# Patient Record
Sex: FEMALE | Race: WHITE | NOT HISPANIC OR LATINO | Employment: UNEMPLOYED
[De-identification: names, ages, dates, MRNs, and addresses within clinical notes are randomized per-mention and may not be internally consistent; named-entity substitution may affect disease eponyms.]

---

## 2014-02-20 LAB — HPV16+18+45 E6+E7MRNA CVX NAA+PROBE: POSITIVE

## 2014-08-20 LAB — CYTOLOGY CVX/VAG DOC THIN PREP: NORMAL

## 2014-11-06 LAB — HM MAMMOGRAPHY BILATERAL: NORMAL

## 2015-08-05 LAB — HM MAMMOGRAM LEFT: NORMAL

## 2017-02-06 ENCOUNTER — CHARTING TRANS (OUTPATIENT)
Dept: OTHER | Age: 52
End: 2017-02-06

## 2017-02-08 ENCOUNTER — LAB SERVICES (OUTPATIENT)
Dept: OTHER | Age: 52
End: 2017-02-08

## 2017-02-09 ENCOUNTER — LAB SERVICES (OUTPATIENT)
Dept: OTHER | Age: 52
End: 2017-02-09

## 2017-02-09 ENCOUNTER — CHARTING TRANS (OUTPATIENT)
Dept: OTHER | Age: 52
End: 2017-02-09

## 2017-02-09 LAB
BILIRUBIN URINE: NEGATIVE
BLOOD URINE: ABNORMAL
CLARITY: CLEAR
COLOR: YELLOW
GLUCOSE QUALITATIVE U: NEGATIVE
KETONES, URINE: ABNORMAL
LEUKOCYTE ESTERASE URINE: NEGATIVE
NITRITE URINE: NEGATIVE
PH URINE: 5.5 (ref 5–7)
SPECIFIC GRAVITY URINE: 1.01 (ref 1–1.03)
URINE PROTEIN, QUAL (DIPSTICK): NEGATIVE
UROBILINOGEN URINE: 0.2

## 2017-02-10 LAB
25(OH)D3 SERPL-MCNC: 46.7 NG/ML (ref 30–100)
CORTIS SERPL-MCNC: 12.4 MCG/DL (ref 3.4–22.5)
T4 FREE SERPL-MCNC: 0.89 NG/DL (ref 0.78–2.19)
TSH SERPL-ACNC: 0.86 M[IU]/L (ref 0.3–4.82)

## 2017-02-13 LAB — ACTH PLAS-MCNC: 8.9 PG/ML (ref 0–46)

## 2017-02-15 LAB
COLLECTION TIME: 24
METANEPHRINE: 108 UG/24 HR (ref 52–341)
METANEPHS UR-MCNC: 294 UG/24 HR (ref 140–785)
NORMETANEPHRINE: 186 UG/24 HR (ref 88–444)
TOTAL VOLUME: 3000

## 2017-02-16 ENCOUNTER — CHARTING TRANS (OUTPATIENT)
Dept: OTHER | Age: 52
End: 2017-02-16

## 2017-02-17 ENCOUNTER — IMAGING SERVICES (OUTPATIENT)
Dept: OTHER | Age: 52
End: 2017-02-17

## 2017-02-17 LAB
CATECHOLS UR-IMP: NORMAL
COLLECT DURATION TIME SPEC: NORMAL H
CREAT 24H UR-MRATE: NORMAL G/(24.H)
CREAT UR-MCNC: NORMAL MG/DL
DOPAMINE 24H UR-MRATE: NORMAL
DOPAMINE UR-MCNC: NORMAL UG/L
DOPAMINE/CREAT UR: NORMAL
EPINEPH UR-MCNC: NORMAL PG/ML
EPINEPH/CREAT UR: NORMAL
EPINEPHRINE/24 HR: NORMAL
NOREPINEPH 24H UR-MRATE: NORMAL
NOREPINEPH UR-MCNC: NORMAL UG/ML
NOREPINEPH/CREAT UR: NORMAL
SPECIMEN VOL ?TM UR: NORMAL ML

## 2017-07-13 ENCOUNTER — EXTERNAL RECORD (OUTPATIENT)
Dept: OTHER | Age: 52
End: 2017-07-13

## 2017-12-08 ENCOUNTER — HOSPITAL ENCOUNTER (OUTPATIENT)
Facility: HOSPITAL | Age: 52
Setting detail: OBSERVATION
Discharge: HOME | End: 2017-12-11
Attending: EMERGENCY MEDICINE | Admitting: HOSPITALIST
Payer: COMMERCIAL

## 2017-12-08 DIAGNOSIS — I47.2 VENTRICULAR TACHYCARDIA, NON-SUSTAINED (HCC): ICD-10-CM

## 2017-12-08 DIAGNOSIS — R55 SYNCOPE AND COLLAPSE: Primary | ICD-10-CM

## 2017-12-08 PROCEDURE — 99223 1ST HOSP IP/OBS HIGH 75: CPT | Performed by: HOSPITALIST

## 2017-12-08 RX ORDER — ACETAMINOPHEN 325 MG/1
650 TABLET ORAL EVERY 4 HOURS PRN
Status: DISCONTINUED | OUTPATIENT
Start: 2017-12-08 | End: 2017-12-11

## 2017-12-08 RX ORDER — ONDANSETRON 2 MG/ML
4 INJECTION INTRAMUSCULAR; INTRAVENOUS EVERY 6 HOURS PRN
Status: DISCONTINUED | OUTPATIENT
Start: 2017-12-08 | End: 2017-12-11

## 2017-12-08 RX ORDER — HYDROCODONE BITARTRATE AND ACETAMINOPHEN 5; 325 MG/1; MG/1
2 TABLET ORAL EVERY 4 HOURS PRN
Status: DISCONTINUED | OUTPATIENT
Start: 2017-12-08 | End: 2017-12-11

## 2017-12-08 RX ORDER — HYDROCODONE BITARTRATE AND ACETAMINOPHEN 5; 325 MG/1; MG/1
1 TABLET ORAL EVERY 4 HOURS PRN
Status: DISCONTINUED | OUTPATIENT
Start: 2017-12-08 | End: 2017-12-11

## 2017-12-08 RX ORDER — ALPRAZOLAM 0.25 MG/1
0.25 TABLET ORAL NIGHTLY PRN
COMMUNITY
End: 2021-08-12 | Stop reason: ALTCHOICE

## 2017-12-08 RX ORDER — BISACODYL 10 MG
10 SUPPOSITORY, RECTAL RECTAL
Status: DISCONTINUED | OUTPATIENT
Start: 2017-12-08 | End: 2017-12-11

## 2017-12-08 RX ORDER — CLONAZEPAM 0.5 MG/1
0.5 TABLET ORAL 2 TIMES DAILY PRN
Status: DISCONTINUED | OUTPATIENT
Start: 2017-12-08 | End: 2017-12-11

## 2017-12-08 RX ORDER — FLUOXETINE 10 MG/1
10 CAPSULE ORAL DAILY
COMMUNITY

## 2017-12-08 RX ORDER — POLYETHYLENE GLYCOL 3350 17 G/17G
17 POWDER, FOR SOLUTION ORAL DAILY PRN
Status: DISCONTINUED | OUTPATIENT
Start: 2017-12-08 | End: 2017-12-11

## 2017-12-08 RX ORDER — ALBUTEROL SULFATE 90 UG/1
2 AEROSOL, METERED RESPIRATORY (INHALATION) EVERY 6 HOURS PRN
COMMUNITY
End: 2021-08-12 | Stop reason: ALTCHOICE

## 2017-12-08 RX ORDER — SOLIFENACIN SUCCINATE 10 MG/1
10 TABLET, FILM COATED ORAL DAILY
COMMUNITY

## 2017-12-08 RX ORDER — CLONAZEPAM 0.5 MG/1
0.5 TABLET ORAL 2 TIMES DAILY PRN
COMMUNITY

## 2017-12-08 RX ORDER — DIAZEPAM 5 MG/1
5 TABLET ORAL NIGHTLY PRN
Status: DISCONTINUED | OUTPATIENT
Start: 2017-12-08 | End: 2017-12-11

## 2017-12-08 RX ORDER — FLUOXETINE 10 MG/1
10 TABLET, FILM COATED ORAL DAILY
Status: DISCONTINUED | OUTPATIENT
Start: 2017-12-09 | End: 2017-12-11

## 2017-12-08 RX ORDER — DOCUSATE SODIUM 100 MG/1
100 CAPSULE, LIQUID FILLED ORAL 2 TIMES DAILY
Status: DISCONTINUED | OUTPATIENT
Start: 2017-12-08 | End: 2017-12-11

## 2017-12-08 RX ORDER — DIAZEPAM 5 MG/1
5 TABLET ORAL NIGHTLY PRN
COMMUNITY

## 2017-12-08 NOTE — ED NOTES
PT STATES SHE WAS TALKING TO HER DAUGHTER AND HER CHEST BECAME TIGHT. PT HAD RUN OF V-TACH AND STATES SHE CONTINUES WITH CHEST PRESSURE.   ER MD AT BEDSIDE

## 2017-12-08 NOTE — ED PROVIDER NOTES
Patient Seen in: THE [facility] Emergency Department In [place]    History   Patient presents with:  Syncope (cardiovascular, neurologic)    Stated Complaint: syncopal episode    HPI    55-year-old female presents to the emerge department after having a synco Comment: normal TI, normal colon; biopsies for                microscopic colitis  No date: HERNIA SURGERY  No date: OTHER SURGICAL HISTORY      Comment: R arm tendon repair        Smoking status: Former Smoker appropriately   Skin: Skin is warm and dry. No pallor. Nursing note and vitals reviewed.            ED Course     Labs Reviewed   COMP METABOLIC PANEL (14) - Abnormal; Notable for the following:        Result Value    AST 13 (*)     All other components w established and baseline blood work obtained. She had the tryptase level ordered as requested by her primary care physician from [facility]. We discussed that this was a send out and they did not anticipate getting those results back.   Patient blood wo

## 2017-12-09 ENCOUNTER — APPOINTMENT (OUTPATIENT)
Dept: CV DIAGNOSTICS | Facility: HOSPITAL | Age: 52
End: 2017-12-09
Attending: INTERNAL MEDICINE
Payer: COMMERCIAL

## 2017-12-09 PROCEDURE — 93306 TTE W/DOPPLER COMPLETE: CPT | Performed by: INTERNAL MEDICINE

## 2017-12-09 PROCEDURE — 99232 SBSQ HOSP IP/OBS MODERATE 35: CPT | Performed by: INTERNAL MEDICINE

## 2017-12-09 RX ORDER — HEPARIN SODIUM 5000 [USP'U]/ML
5000 INJECTION, SOLUTION INTRAVENOUS; SUBCUTANEOUS EVERY 8 HOURS SCHEDULED
Status: DISCONTINUED | OUTPATIENT
Start: 2017-12-09 | End: 2017-12-11

## 2017-12-09 RX ORDER — DIPHENHYDRAMINE HCL 25 MG
25 CAPSULE ORAL EVERY 6 HOURS PRN
Status: DISCONTINUED | OUTPATIENT
Start: 2017-12-09 | End: 2017-12-11

## 2017-12-09 NOTE — PLAN OF CARE
Pt currently resting in bed. Called into room at approximately [phone] with pt stating she was starting to feel chest tightness and faint, per symptoms mimicking what usually precipitates her syncopal episodes. VSS, no changes on tele noted.   Pt continues n

## 2017-12-09 NOTE — PROGRESS NOTES
[facility]  Progress Note     Keshia Hdz Patient Status:  Inpatient    3/7/1965 MRN [id number]   [facility] 7NE-A Attending Jerrod Garzon MD   Hosp Day # 1 PCP Matilde Severance     Chief Complaint: NSVT, syncope    S: Patient with EP to eval  4. Start BB  5. Possible EPS/Cardiac MRI  2. Ehler's-Danlos  3. MTHFR  4. Fibromyalgia   5. Chronic pain on prn norco   6.  Anxiety     Plan of care: await cardiac workup    Quality:  · DVT Prophylaxis: SCD, HSQ  · CODE status: Full  · Echols: no

## 2017-12-09 NOTE — H&P
[facility]  History and Physical     [facility] Patient Status:  Inpatient    3/7/1965 MRN [id number]   [facility] 7NE-A Attending Ignacio Aragon MD   Hosp Day # 0 PCP Luis Chambers     Chief Complaint: NSVT    History of Prese Mother      COPD   • Neurological Disorder Brother      club foot.     • Psychiatric Brother      drug abuse in past   • Other [OTHER] Brother      paralyzed from self inflicted wound   • Other [OTHER] Son      schizophrenic       Allergies:   No Known Celestino HGB  14.1   MCV  95.9   PLT  233.0       Recent Labs   Lab  12/08/17   1547   GLU  73   BUN  13   CREATSERUM  1.00   CA  8.3   ALB  3.6   NA  140   K  4.1   CL  105   CO2  30.0   ALKPHO  47   AST  13*   ALT  19   BILT  0.3   TP  6.7       Estimated Creat

## 2017-12-09 NOTE — CONSULTS
[facility]  Cardiology History & Physical    Liu [place] Patient Status:  Inpatient    3/7/1965 MRN [id number]   [facility] 7NE-A Attending Luis F Bae MD   Hosp Day # 0 NICOLASA Porter     Date of Admission:  2017    Histo Comment: R arm tendon repair    Social History:  Social History    Marital status:              Spouse name:                       Years of education:                 Number of children:               Social History Main Topics    Smoking status:  Fo 1802   Gross per 24 hour   Intake             1000 ml   Output                0 ml   Net             1000 ml     Wt Readings from Last 3 Encounters:  12/08/17 : 44 lb 0.4 oz (20 kg)    Physical Exam:   General: Alert and oriented x 3. No apparent distress.

## 2017-12-09 NOTE — PROGRESS NOTES
[facility]  Progress Note    Usha Nolen Patient Status:  Inpatient    3/7/1965 MRN [id number]   [facility] 7NE-A Attending Harriet Carroll MD   Hosp Day # 1 PCP Precilla Mutters       Assessment and Plan:  Patient Active Problem List: kg)  03/27/12 : 167 lb (75.8 kg)      Allergies:    No Known Allergies          Physical Exam:  Blood pressure 90/48, pulse 57, temperature 98.4 °F (36.9 °C), temperature source Oral, resp.  rate 16, height 5' 5\" (1.651 m), weight 120 lb (54.4 kg), last me MD  12/9/2017  7:30 AM

## 2017-12-09 NOTE — PLAN OF CARE
Patient admitted in stable condition. Resting comfortably in bed. Denies any dizziness. Feels ok right now. Plan for 2D echo in am. Will monitor. No ectopy noted on tele. Remains SR/SB overnight.

## 2017-12-10 PROCEDURE — 99232 SBSQ HOSP IP/OBS MODERATE 35: CPT | Performed by: INTERNAL MEDICINE

## 2017-12-10 RX ORDER — SODIUM CHLORIDE 9 MG/ML
INJECTION, SOLUTION INTRAVENOUS CONTINUOUS
Status: DISCONTINUED | OUTPATIENT
Start: 2017-12-10 | End: 2017-12-11

## 2017-12-10 NOTE — PROGRESS NOTES
[facility] Cardiology Progress Note    Subjective:  Feels like \"crap:\". Ongoing neck and upper chest pain, a chronic issue.      Objective:  BP 93/50 (BP Location: Left arm)   Pulse 58   Temp 97.9 °F (36.6 °C) (Oral)   Resp 17   Ht 165.1 cm (5' 5\")   Wt 120

## 2017-12-10 NOTE — PROGRESS NOTES
[facility]  Progress Note     Keshia Hdz Patient Status:  Inpatient    3/7/1965 MRN [id number]   [facility] 7NE-A Attending Jerrod Garzon MD   Hosp Day # 2 PCP Matilde Severance     Chief Complaint: NSVT, syncope    S: Patient with of syncope   1. Tele  2. Echo reviewed, normal  3. Cardiology, EP to eval  4. Start BB  5. Possible EPS/Cardiac MRI  2. Ehler's-Danlos  3. MTHFR  4. Fibromyalgia   5. Chronic pain on prn norco   6.  Anxiety     Plan of care: await cardiac workup    Quality:

## 2017-12-10 NOTE — PLAN OF CARE
DISCHARGE PLANNING    • Discharge to home or other facility with appropriate resources Progressing        NEUROLOGICAL - ADULT    • Achieves maximal functionality and self care Progressing        Patient/Family Goals    • Patient/Family Long Term Goal Prog

## 2017-12-10 NOTE — PLAN OF CARE
Pt currently resting in bed. RA, spo2>93%. SR/SB on tele. A&O. No pre-syncopal aura thus far this shift. Will continue to monitor.

## 2017-12-10 NOTE — PROGRESS NOTES
Assumed care of patient from [phone]. Patient is alert and oriented. Received Norco once for complaints of chronic back pain. Benadryl ordered for her for complaints of itchiness. No complaints of feeling faint or chest tightness.   No complaints of n

## 2017-12-11 VITALS
RESPIRATION RATE: 17 BRPM | OXYGEN SATURATION: 100 % | BODY MASS INDEX: 19.99 KG/M2 | WEIGHT: 120 LBS | TEMPERATURE: 98 F | SYSTOLIC BLOOD PRESSURE: 101 MMHG | HEART RATE: 63 BPM | DIASTOLIC BLOOD PRESSURE: 55 MMHG | HEIGHT: 65 IN

## 2017-12-11 PROCEDURE — 99239 HOSP IP/OBS DSCHRG MGMT >30: CPT | Performed by: INTERNAL MEDICINE

## 2017-12-11 PROCEDURE — 99255 IP/OBS CONSLTJ NEW/EST HI 80: CPT | Performed by: OTHER

## 2017-12-11 PROCEDURE — 95816 EEG AWAKE AND DROWSY: CPT | Performed by: OTHER

## 2017-12-11 NOTE — CONSULTS
[facility]    Report of Consultation    Mirella Render Patient Status:  Inpatient    3/7/1965 MRN [id number]   [facility] 7NE-A Attending Mary Alaniz MD   [facility] Day # 3 PCP Michelle Timmons     Date of Admission:  2017  Date of C Relation Age of Onset   • Heart Disorder Father    • Alcohol and Other Disorders Associated Father    • Heart Disease Father    • Other Sam Nian Father      COPD   • Cancer Mother      renal cell   • Hypertension Mother    • Other [OTHER] Mother      COPD temperature source Oral, resp. rate 17, height 65\", weight 120 lb, last menstrual period 07/28/2011, SpO2 100 %. GENERAL:  Patient is a(n) 46year old female in no acute distress.   HEENT:  Normocephalic, atraumatic  LUNGS: Clear to auscultation bilater low. However, would obtain EEG to evaluate for presence of any abnormal temporal discharges. I suspect this may be a dysautonomic disorder which can be seen with EDS.  Further confirmatory testing at an autonomic center is recommended- tilt table, sudomotor

## 2017-12-11 NOTE — PLAN OF CARE
Assumed care of patient at 0730  AOx4, RA, NSR on tele  C/o back pain- managed with PO [place] at patient request  VSS  Tolerating diet with good appetite  Patient reports lack of sleep last noc, no reports of dizziness currently but is intermittent  IVF inf

## 2017-12-11 NOTE — PROGRESS NOTES
After EEG completed, patient reported brief episode of pain in her chest, patient reports that \"this is the same pain I get before every episode\" Also reported facial twitching and HA.  Patient sitting up in bed laughing and talking with her  and E

## 2017-12-11 NOTE — PLAN OF CARE
NURSING DISCHARGE NOTE    Discharged home via private car  Accompanied by   Belongings packed up by patient and taken with    All d/c instructions given to patient and discussed. Medications and f/u appts discussed.  Rx for Lopressor given to pat

## 2017-12-11 NOTE — PROGRESS NOTES
[facility]  Progress Note    Naz Gunn Patient Status:  Inpatient    3/7/1965 MRN [id number]   [facility] 7NE-A Attending Krzysztof Schwarz MD   [facility] Day # 3 PCP Roney Maria     Assessment:  1.   Recurren ?syncope - description does 5,000 Units Subcutaneous [facility]   • metoprolol tartrate  6.25 mg Oral 2x Daily(Beta Blocker)   • FLUoxetine HCl  10 mg Oral Daily   • docusate sodium  100 mg Oral BID     • sodium chloride 100 mL/hr at 12/11/17 0841       Anitha Henry MD  12/11/2017  11:

## 2017-12-11 NOTE — PROGRESS NOTES
[facility]  Progress Note     Sajan Adánal Patient Status:  Inpatient    3/7/1965 MRN [id number]   [facility] 7NE-A Attending Cata Rodriguez MD   Hosp Day # 3 PCP Jessie Angel     Chief Complaint: NSVT, syncope    S: Patient with 1. Tele  2. Echo reviewed, normal  3. Cardiology, EP to eval  4. Tolerating BB  5. Possible EPS/Cardiac MRI  2. Ehler's-Danlos  3. MTHFR  4. Fibromyalgia   5. Chronic pain on prn norco   6.  Anxiety     Plan of care: await cardia/EP workup    Quality:  ·

## 2017-12-11 NOTE — PLAN OF CARE
Patient/Family Goals    • Patient/Family Long Term Goal Progressing    • Patient/Family Short Term Goal Progressing        SAFETY ADULT - FALL    • Free from fall injury Progressing          Patient is alert and oriented.   About 2030 patient complained of

## 2017-12-11 NOTE — DISCHARGE SUMMARY
[facility]  DISCHARGE SUMMARY     Jensen Hardy Patient Status:  Inpatient    3/7/1965 MRN [id number]   [facility] 7NE-A Attending Mukesh mAador MD   [facility] Day # 3 PCP Albino Frazier     Date of Admission: 2017  Date of Dischar recommendations (brief descriptions):  • none    Lab/Test results pending at Discharge:   · none    Consultants:  • Cardiology, Neurology    Discharge Medication List:     Discharge Medications      CHANGE how you take these medications      Instructions P INSTRUCTIONS: See electronic chart    Vanesa Molina MD 12/11/2017    Time spent:  > 30 minutes

## 2017-12-12 NOTE — PROCEDURES
[address]      PATIENT'S NAME: Jorge Gustafson   ATTENDING PHYSICIAN: Tyson Clarke MD   PATIENT ACCOUNT #: [de-identified] LOCATION: [address] [place] [address]   MEDICAL RECORD #: [id number] DATE OF BIRTH: 1

## 2017-12-14 NOTE — PAYOR COMM NOTE
--------------  Amy Zamudio  (MR # [id number])   Order Admit to inpatient [facility] (Order [id number])   Order Requisition   Admit to inpatient [facility] (Order #[id number]) on 12/8/17        Question Answer   Diagnosis Syncope and collapse   Level of Care Tele 59-year-old female presents to the emerge department after having a syncopal episode. Patient states she has had multiple syncopal episodes and has been worked up at [facility].   She states she had an angiogram back in February that she was told had \"n Eyes: EOM are normal. Pupils are equal, round, and reactive to light. Neck: Normal range of motion. Neck supple. Cardiovascular: Normal rate, regular rhythm, normal heart sounds and intact distal pulses.     Pulmonary/Chest: Effort normal and breath tennille Patient IV established and baseline blood work obtained. She had the tryptase level ordered as requested by her primary care physician from [facility]. We discussed that this was a send out and they did not anticipate getting those results back.   José Miguel GLU  73   BUN  13   CREATSERUM  1.00   CA  8.3   ALB  3.6   NA  140   K  4.1   CL  105   CO2  30.0   ALKPHO  47   AST  13*   ALT  19   BILT  0.3   TP  6.7     TROP  <0.046     ASSESSMENT / PLAN:     1. NSVT with history of syncope   1. Tele  2. Echo  3.  Ca Vital signs:  Temp:  [98 °F (36.7 °C)-98.8 °F (37.1 °C)] 98.4 °F (36.9 °C)  Pulse:  [57-66] 64  Resp:  [15-17] 17  BP: ()/(48-95) 116/67    NURSING:  Pt currently resting in bed.   Called into room at approximately [phone] with pt stating she was startin · EP to see tomorrow    About 2030 patient complained of lightheadedness. BP was 101/59. Recheck an hour later BP was 86/62 while sitting and 109/87 when standing. IV fluids started.   Encouraged patient to call for assistance before ambulating due to lo

## 2018-11-23 ENCOUNTER — IMAGING SERVICES (OUTPATIENT)
Dept: OTHER | Age: 53
End: 2018-11-23

## 2018-11-29 VITALS
HEIGHT: 66 IN | SYSTOLIC BLOOD PRESSURE: 98 MMHG | HEART RATE: 52 BPM | DIASTOLIC BLOOD PRESSURE: 58 MMHG | BODY MASS INDEX: 18.32 KG/M2 | WEIGHT: 114 LBS

## 2018-12-05 LAB — COLONOSCOPY STUDY: NORMAL

## 2018-12-07 LAB — NONINV COLON CA DNA+OCC BLD SCRN STL QL: NEGATIVE

## 2019-08-26 ENCOUNTER — EXTERNAL RECORD (OUTPATIENT)
Dept: OTHER | Age: 54
End: 2019-08-26

## 2019-12-31 ENCOUNTER — EXTERNAL RECORD (OUTPATIENT)
Dept: OTHER | Age: 54
End: 2019-12-31

## 2020-06-24 ENCOUNTER — EXTERNAL RECORD (OUTPATIENT)
Dept: OTHER | Age: 55
End: 2020-06-24

## 2021-01-25 ENCOUNTER — EXTERNAL RECORD (OUTPATIENT)
Dept: OTHER | Age: 56
End: 2021-01-25

## 2021-01-26 ENCOUNTER — EXTERNAL RECORD (OUTPATIENT)
Dept: OTHER | Age: 56
End: 2021-01-26

## 2021-03-09 ENCOUNTER — EXTERNAL RECORD (OUTPATIENT)
Dept: OTHER | Age: 56
End: 2021-03-09

## 2021-05-25 ENCOUNTER — EXTERNAL RECORD (OUTPATIENT)
Dept: OTHER | Age: 56
End: 2021-05-25

## 2021-06-01 ENCOUNTER — EXTERNAL RECORD (OUTPATIENT)
Dept: OTHER | Age: 56
End: 2021-06-01

## 2021-08-12 ENCOUNTER — OFFICE VISIT (OUTPATIENT)
Dept: HEMATOLOGY/ONCOLOGY | Facility: HOSPITAL | Age: 56
End: 2021-08-12
Attending: INTERNAL MEDICINE
Payer: COMMERCIAL

## 2021-08-12 VITALS
OXYGEN SATURATION: 96 % | HEART RATE: 63 BPM | SYSTOLIC BLOOD PRESSURE: 122 MMHG | DIASTOLIC BLOOD PRESSURE: 74 MMHG | BODY MASS INDEX: 19.61 KG/M2 | TEMPERATURE: 98 F | WEIGHT: 122 LBS | HEIGHT: 66 IN | RESPIRATION RATE: 16 BRPM

## 2021-08-12 DIAGNOSIS — Q79.60 EHLERS-DANLOS SYNDROME: ICD-10-CM

## 2021-08-12 DIAGNOSIS — R58 BLEEDING: Primary | ICD-10-CM

## 2021-08-12 PROCEDURE — 99204 OFFICE O/P NEW MOD 45 MIN: CPT | Performed by: INTERNAL MEDICINE

## 2021-08-12 RX ORDER — FLUTICASONE PROPIONATE 50 MCG
2 SPRAY, SUSPENSION (ML) NASAL DAILY
COMMUNITY
Start: 2021-08-04

## 2021-08-12 RX ORDER — RIZATRIPTAN BENZOATE 5 MG/1
5 TABLET, ORALLY DISINTEGRATING ORAL DAILY PRN
COMMUNITY
Start: 2021-08-08

## 2021-08-12 RX ORDER — CLONAZEPAM 1 MG/1
TABLET ORAL
COMMUNITY
Start: 2021-07-23

## 2021-08-12 RX ORDER — METHYLPHENIDATE HYDROCHLORIDE 10 MG/1
TABLET ORAL
COMMUNITY
Start: 2021-07-23

## 2021-08-13 NOTE — CONSULTS
[facility]    PATIENT'S NAME: PADUA, JADA M   CONSULTING PHYSICIAN: James Donovan.  Jeanette Crow MD   PATIENT ACCOUNT #: [de-identified] LOCATION: [address] [place] [address] RECORD #: [id number] YOB: 1965   CONSULTATION DATE: 08/12/2021       [facility] hydrocodone/acetaminophen, metoprolol, vitamin B, omega fatty acids, fish oil. ALLERGIES:  Flexeril, NSAIDs, penicillins, quinolones, Reglan, tramadol.     SOCIAL HISTORY:  She denies any tobacco use, drinks alcohol on occasion, denies any illicit drug u very much for the consultation request and for allowing us to participate in the care of this delightful patient. Dictated By Alessandra Fan MD  d: 08/12/2021 16:16:00  t: 08/13/2021 07:56:56  Job [id number]  [facility]/

## 2021-08-17 ENCOUNTER — EXTERNAL RECORD (OUTPATIENT)
Dept: OTHER | Age: 56
End: 2021-08-17

## 2021-08-24 ENCOUNTER — TELEPHONE (OUTPATIENT)
Dept: HEMATOLOGY/ONCOLOGY | Facility: HOSPITAL | Age: 56
End: 2021-08-24

## 2021-08-24 NOTE — TELEPHONE ENCOUNTER
Lab ordered by Dr Suzanna Barraza not completed for tomorrow's visit. Patient contacted and she shares she cannot get the test done at this time. She was in the ED with pain which required steroids and IV narcotics.   She does not know when she can complete the testin

## 2021-08-25 ENCOUNTER — APPOINTMENT (OUTPATIENT)
Dept: HEMATOLOGY/ONCOLOGY | Facility: HOSPITAL | Age: 56
End: 2021-08-25
Attending: INTERNAL MEDICINE
Payer: COMMERCIAL

## 2021-10-13 ENCOUNTER — TELEPHONE (OUTPATIENT)
Dept: INTERNAL MEDICINE | Age: 56
End: 2021-10-13

## 2021-10-20 DIAGNOSIS — M19.90 ARTHRITIS: Primary | ICD-10-CM

## 2021-10-20 DIAGNOSIS — M79.7 FIBROMYALGIA: ICD-10-CM

## 2021-11-09 ENCOUNTER — HOSPITAL ENCOUNTER (OUTPATIENT)
Dept: GENERAL RADIOLOGY | Age: 56
Discharge: HOME | End: 2021-11-09
Attending: NURSE PRACTITIONER

## 2021-11-09 ENCOUNTER — LAB SERVICES (OUTPATIENT)
Dept: LAB | Age: 56
End: 2021-11-09

## 2021-11-09 ENCOUNTER — OFFICE VISIT (OUTPATIENT)
Dept: RHEUMATOLOGY | Age: 56
End: 2021-11-09
Attending: INTERNAL MEDICINE

## 2021-11-09 VITALS
BODY MASS INDEX: 21.36 KG/M2 | DIASTOLIC BLOOD PRESSURE: 70 MMHG | HEIGHT: 65 IN | SYSTOLIC BLOOD PRESSURE: 116 MMHG | RESPIRATION RATE: 16 BRPM | HEART RATE: 62 BPM | WEIGHT: 128.2 LBS

## 2021-11-09 DIAGNOSIS — M25.50 POLYARTHRALGIA: ICD-10-CM

## 2021-11-09 DIAGNOSIS — F11.90 OPIOID USE: ICD-10-CM

## 2021-11-09 DIAGNOSIS — I73.00 RAYNAUD'S DISEASE WITHOUT GANGRENE: ICD-10-CM

## 2021-11-09 DIAGNOSIS — R76.8 POSITIVE ANA (ANTINUCLEAR ANTIBODY): ICD-10-CM

## 2021-11-09 DIAGNOSIS — M25.50 POLYARTHRALGIA: Primary | ICD-10-CM

## 2021-11-09 DIAGNOSIS — M51.36 DDD (DEGENERATIVE DISC DISEASE), LUMBAR: ICD-10-CM

## 2021-11-09 DIAGNOSIS — M35.7 HYPERMOBILITY SYNDROME: ICD-10-CM

## 2021-11-09 LAB
CRP SERPL-MCNC: <0.3 MG/DL
ERYTHROCYTE [SEDIMENTATION RATE] IN BLOOD BY WESTERGREN METHOD: 5 MM/HR (ref 0–20)

## 2021-11-09 PROCEDURE — 73030 X-RAY EXAM OF SHOULDER: CPT

## 2021-11-09 PROCEDURE — 73630 X-RAY EXAM OF FOOT: CPT

## 2021-11-09 PROCEDURE — 86140 C-REACTIVE PROTEIN: CPT | Performed by: INTERNAL MEDICINE

## 2021-11-09 PROCEDURE — 73130 X-RAY EXAM OF HAND: CPT | Performed by: RADIOLOGY

## 2021-11-09 PROCEDURE — 73630 X-RAY EXAM OF FOOT: CPT | Performed by: RADIOLOGY

## 2021-11-09 PROCEDURE — 73521 X-RAY EXAM HIPS BI 2 VIEWS: CPT

## 2021-11-09 PROCEDURE — 73521 X-RAY EXAM HIPS BI 2 VIEWS: CPT | Performed by: RADIOLOGY

## 2021-11-09 PROCEDURE — 86038 ANTINUCLEAR ANTIBODIES: CPT | Performed by: INTERNAL MEDICINE

## 2021-11-09 PROCEDURE — 86431 RHEUMATOID FACTOR QUANT: CPT | Performed by: INTERNAL MEDICINE

## 2021-11-09 PROCEDURE — 86235 NUCLEAR ANTIGEN ANTIBODY: CPT | Performed by: INTERNAL MEDICINE

## 2021-11-09 PROCEDURE — 85652 RBC SED RATE AUTOMATED: CPT | Performed by: INTERNAL MEDICINE

## 2021-11-09 PROCEDURE — 99204 OFFICE O/P NEW MOD 45 MIN: CPT | Performed by: NURSE PRACTITIONER

## 2021-11-09 PROCEDURE — 36415 COLL VENOUS BLD VENIPUNCTURE: CPT | Performed by: INTERNAL MEDICINE

## 2021-11-09 PROCEDURE — 73030 X-RAY EXAM OF SHOULDER: CPT | Performed by: RADIOLOGY

## 2021-11-09 PROCEDURE — 73130 X-RAY EXAM OF HAND: CPT

## 2021-11-09 PROCEDURE — 86039 ANTINUCLEAR ANTIBODIES (ANA): CPT | Performed by: INTERNAL MEDICINE

## 2021-11-09 PROCEDURE — 86160 COMPLEMENT ANTIGEN: CPT | Performed by: INTERNAL MEDICINE

## 2021-11-09 RX ORDER — RIZATRIPTAN BENZOATE 5 MG/1
5 TABLET, ORALLY DISINTEGRATING ORAL PRN
COMMUNITY

## 2021-11-09 RX ORDER — ACETAZOLAMIDE 250 MG/1
250 TABLET ORAL EVERY 4 HOURS PRN
COMMUNITY
End: 2022-05-12 | Stop reason: ALTCHOICE

## 2021-11-09 RX ORDER — LANOLIN ALCOHOL/MO/W.PET/CERES
1000 CREAM (GRAM) TOPICAL 2 TIMES DAILY
COMMUNITY
End: 2022-05-12 | Stop reason: ALTCHOICE

## 2021-11-09 RX ORDER — HYDROCODONE BITARTRATE AND ACETAMINOPHEN 10; 325 MG/1; MG/1
1 TABLET ORAL 4 TIMES DAILY PRN
COMMUNITY

## 2021-11-09 RX ORDER — CLONAZEPAM 0.5 MG/1
0.5 TABLET ORAL 3 TIMES DAILY PRN
COMMUNITY

## 2021-11-09 RX ORDER — CLONAZEPAM 1 MG/1
1 TABLET ORAL NIGHTLY PRN
COMMUNITY
End: 2021-12-02 | Stop reason: SDUPTHER

## 2021-11-09 RX ORDER — GINGER ROOT
POWDER (GRAM) MISCELLANEOUS
COMMUNITY

## 2021-11-09 RX ORDER — ONDANSETRON 4 MG/1
4 TABLET, FILM COATED ORAL DAILY PRN
COMMUNITY

## 2021-11-09 RX ORDER — FLUTICASONE PROPIONATE 50 MCG
2 SPRAY, SUSPENSION (ML) NASAL DAILY
COMMUNITY

## 2021-11-09 RX ORDER — DIAZEPAM 5 MG/1
5 TABLET ORAL 3 TIMES DAILY PRN
COMMUNITY

## 2021-11-10 LAB
C3 SERPL-MCNC: 65 MG/DL (ref 79–152)
C4 SERPL-MCNC: 21.3 MG/DL (ref 16–38)
ENA SS-A IGG SER IA-ACNC: <0.2 AI
ENA SS-B IGG SER IA-ACNC: <0.2 AI

## 2021-11-11 PROBLEM — I73.00 RAYNAUD'S DISEASE WITHOUT GANGRENE: Status: ACTIVE | Noted: 2021-11-11

## 2021-11-11 PROBLEM — M51.36 DDD (DEGENERATIVE DISC DISEASE), LUMBAR: Status: ACTIVE | Noted: 2021-11-11

## 2021-11-11 PROBLEM — G90.1 DYSAUTONOMIA (CMD): Status: ACTIVE | Noted: 2021-11-11

## 2021-11-11 PROBLEM — M51.369 DDD (DEGENERATIVE DISC DISEASE), LUMBAR: Status: ACTIVE | Noted: 2021-11-11

## 2021-11-11 PROBLEM — G96.00 CSF LEAK: Status: ACTIVE | Noted: 2021-11-11

## 2021-11-11 PROBLEM — M35.7 HYPERMOBILITY SYNDROME: Status: ACTIVE | Noted: 2021-11-11

## 2021-11-15 LAB
ANA PAT SER IF-IMP: ABNORMAL
ANA TITR SER IF: ABNORMAL {TITER}
RHEUMATOID FACT SER NEPH-ACNC: <10 UNITS/ML

## 2021-11-16 ENCOUNTER — E-ADVICE (OUTPATIENT)
Dept: RHEUMATOLOGY | Age: 56
End: 2021-11-16

## 2021-11-16 LAB — ANA SER QL IA: NEGATIVE

## 2021-11-17 ENCOUNTER — HOSPITAL ENCOUNTER (OUTPATIENT)
Dept: REHABILITATION | Age: 56
Discharge: STILL ADMITTED OR ADMINISTRATIVE | End: 2021-11-17
Attending: NURSE PRACTITIONER

## 2021-11-17 DIAGNOSIS — M35.7 HYPERMOBILITY SYNDROME: ICD-10-CM

## 2021-11-17 PROCEDURE — 97166 OT EVAL MOD COMPLEX 45 MIN: CPT | Performed by: OCCUPATIONAL THERAPIST

## 2021-11-17 PROCEDURE — 10004172 HB COUNTER-THERAPY VISIT OT: Performed by: OCCUPATIONAL THERAPIST

## 2021-11-17 PROCEDURE — 97530 THERAPEUTIC ACTIVITIES: CPT | Performed by: OCCUPATIONAL THERAPIST

## 2021-11-17 ASSESSMENT — ENCOUNTER SYMPTOMS
ALLEVIATING FACTORS: RELAXATION TECHNIQUES
SUBJECTIVE PAIN PROGRESSION: WORSENING
QUALITY: SHARP
PAIN FREQUENCY: CONSTANT
PAIN SCALE AT HIGHEST: 9
QUALITY: ACHE
ALLEVIATING FACTORS: PRESCRIPTION MEDICATIONS
PAIN SCALE AT LOWEST: 6
PAIN SEVERITY NOW: 6
PAIN LOCATION: "EVERYWHERE"""
QUALITY: PRESSURE

## 2021-11-23 ENCOUNTER — TELEPHONE (OUTPATIENT)
Dept: PAIN MANAGEMENT | Age: 56
End: 2021-11-23

## 2021-11-23 ENCOUNTER — E-ADVICE (OUTPATIENT)
Dept: RHEUMATOLOGY | Age: 56
End: 2021-11-23

## 2021-11-23 ENCOUNTER — TELEPHONE (OUTPATIENT)
Dept: RHEUMATOLOGY | Age: 56
End: 2021-11-23

## 2021-11-26 ENCOUNTER — HOSPITAL ENCOUNTER (OUTPATIENT)
Dept: REHABILITATION | Age: 56
Discharge: STILL ADMITTED OR ADMINISTRATIVE | End: 2021-11-26
Attending: NURSE PRACTITIONER

## 2021-11-26 PROCEDURE — 97110 THERAPEUTIC EXERCISES: CPT | Performed by: OCCUPATIONAL THERAPIST

## 2021-11-26 PROCEDURE — 97530 THERAPEUTIC ACTIVITIES: CPT | Performed by: OCCUPATIONAL THERAPIST

## 2021-11-26 PROCEDURE — 97018 PARAFFIN BATH THERAPY: CPT | Performed by: OCCUPATIONAL THERAPIST

## 2021-11-26 PROCEDURE — 10004172 HB COUNTER-THERAPY VISIT OT: Performed by: OCCUPATIONAL THERAPIST

## 2021-11-26 ASSESSMENT — ENCOUNTER SYMPTOMS
PAIN FREQUENCY: CONSTANT
PAIN SEVERITY NOW: 5

## 2021-11-29 ENCOUNTER — CLINICAL ABSTRACT (OUTPATIENT)
Dept: HEALTH INFORMATION MANAGEMENT | Age: 56
End: 2021-11-29

## 2021-12-02 ENCOUNTER — OFFICE VISIT (OUTPATIENT)
Dept: PAIN MANAGEMENT | Age: 56
End: 2021-12-02
Attending: NURSE PRACTITIONER

## 2021-12-02 ENCOUNTER — APPOINTMENT (OUTPATIENT)
Dept: REHABILITATION | Age: 56
End: 2021-12-02
Attending: NURSE PRACTITIONER

## 2021-12-02 VITALS
HEART RATE: 64 BPM | SYSTOLIC BLOOD PRESSURE: 102 MMHG | HEIGHT: 66 IN | WEIGHT: 121.25 LBS | DIASTOLIC BLOOD PRESSURE: 82 MMHG | BODY MASS INDEX: 19.49 KG/M2 | OXYGEN SATURATION: 96 %

## 2021-12-02 DIAGNOSIS — M35.7 HYPERMOBILITY SYNDROME: ICD-10-CM

## 2021-12-02 DIAGNOSIS — G89.29 CHRONIC MYOFASCIAL PAIN: ICD-10-CM

## 2021-12-02 DIAGNOSIS — R52 CHRONIC GENERALIZED PAIN: Primary | ICD-10-CM

## 2021-12-02 DIAGNOSIS — M79.7 FIBROMYALGIA: ICD-10-CM

## 2021-12-02 DIAGNOSIS — G89.29 CHRONIC GENERALIZED PAIN: Primary | ICD-10-CM

## 2021-12-02 DIAGNOSIS — M79.18 CHRONIC MYOFASCIAL PAIN: ICD-10-CM

## 2021-12-02 DIAGNOSIS — M25.50 PAIN IN JOINT, MULTIPLE SITES: ICD-10-CM

## 2021-12-02 PROCEDURE — 99204 OFFICE O/P NEW MOD 45 MIN: CPT | Performed by: ANESTHESIOLOGY

## 2021-12-02 RX ORDER — CLONAZEPAM 1 MG/1
TABLET, ORALLY DISINTEGRATING ORAL
COMMUNITY
Start: 2021-11-20

## 2021-12-02 RX ORDER — B-COMPLEX WITH VITAMIN C
1 TABLET ORAL DAILY
COMMUNITY
End: 2022-05-12 | Stop reason: ALTCHOICE

## 2021-12-10 ENCOUNTER — APPOINTMENT (OUTPATIENT)
Dept: REHABILITATION | Age: 56
End: 2021-12-10
Attending: NURSE PRACTITIONER

## 2022-01-01 ENCOUNTER — EXTERNAL RECORD (OUTPATIENT)
Dept: OTHER | Age: 57
End: 2022-01-01

## 2022-02-01 ENCOUNTER — APPOINTMENT (OUTPATIENT)
Dept: RHEUMATOLOGY | Age: 57
End: 2022-02-01
Attending: INTERNAL MEDICINE

## 2022-03-01 ENCOUNTER — OFFICE VISIT (OUTPATIENT)
Dept: INTERNAL MEDICINE | Age: 57
End: 2022-03-01

## 2022-03-01 VITALS
BODY MASS INDEX: 20.38 KG/M2 | WEIGHT: 126.8 LBS | DIASTOLIC BLOOD PRESSURE: 74 MMHG | HEART RATE: 74 BPM | SYSTOLIC BLOOD PRESSURE: 120 MMHG | HEIGHT: 66 IN | RESPIRATION RATE: 12 BRPM

## 2022-03-01 DIAGNOSIS — Z12.11 COLON CANCER SCREENING: ICD-10-CM

## 2022-03-01 DIAGNOSIS — M35.7 HYPERMOBILITY SYNDROME: Primary | ICD-10-CM

## 2022-03-01 DIAGNOSIS — M51.36 DDD (DEGENERATIVE DISC DISEASE), LUMBAR: ICD-10-CM

## 2022-03-01 DIAGNOSIS — M79.18 CHRONIC MYOFASCIAL PAIN: ICD-10-CM

## 2022-03-01 DIAGNOSIS — G89.29 CHRONIC MYOFASCIAL PAIN: ICD-10-CM

## 2022-03-01 DIAGNOSIS — M79.7 FIBROMYALGIA: ICD-10-CM

## 2022-03-01 PROCEDURE — 99204 OFFICE O/P NEW MOD 45 MIN: CPT | Performed by: INTERNAL MEDICINE

## 2022-03-01 RX ORDER — EPINEPHRINE 0.3 MG/.3ML
INJECTION SUBCUTANEOUS
COMMUNITY

## 2022-03-01 RX ORDER — FEXOFENADINE HCL AND PSEUDOEPHEDRINE HCI 180; 240 MG/1; MG/1
TABLET, EXTENDED RELEASE ORAL
COMMUNITY
End: 2022-05-12 | Stop reason: ALTCHOICE

## 2022-03-01 ASSESSMENT — PATIENT HEALTH QUESTIONNAIRE - PHQ9
SUM OF ALL RESPONSES TO PHQ9 QUESTIONS 1 AND 2: 0
CLINICAL INTERPRETATION OF PHQ2 SCORE: NO FURTHER SCREENING NEEDED
SUM OF ALL RESPONSES TO PHQ9 QUESTIONS 1 AND 2: 0
2. FEELING DOWN, DEPRESSED OR HOPELESS: NOT AT ALL
1. LITTLE INTEREST OR PLEASURE IN DOING THINGS: NOT AT ALL

## 2022-03-15 ENCOUNTER — OFFICE VISIT (OUTPATIENT)
Dept: DERMATOLOGY | Age: 57
End: 2022-03-15

## 2022-03-15 DIAGNOSIS — L71.0 PERIORAL DERMATITIS: ICD-10-CM

## 2022-03-15 DIAGNOSIS — D48.5 NEOPLASM OF UNCERTAIN BEHAVIOR OF SKIN: Primary | ICD-10-CM

## 2022-03-15 PROCEDURE — 99204 OFFICE O/P NEW MOD 45 MIN: CPT | Performed by: DERMATOLOGY

## 2022-03-15 RX ORDER — METRONIDAZOLE 7.5 MG/G
1.5 GEL TOPICAL AT BEDTIME
Qty: 45 G | Refills: 11 | Status: SHIPPED | OUTPATIENT
Start: 2022-03-15

## 2022-03-15 RX ORDER — PIMECROLIMUS 10 MG/G
1 CREAM TOPICAL 2 TIMES DAILY PRN
Qty: 60 G | Refills: 11 | Status: SHIPPED | OUTPATIENT
Start: 2022-03-15

## 2022-04-06 ENCOUNTER — APPOINTMENT (OUTPATIENT)
Dept: LAB | Age: 57
End: 2022-04-06

## 2022-04-06 ENCOUNTER — OFFICE VISIT (OUTPATIENT)
Dept: DERMATOLOGY | Age: 57
End: 2022-04-06

## 2022-04-06 ENCOUNTER — LAB SERVICES (OUTPATIENT)
Dept: LAB | Age: 57
End: 2022-04-06

## 2022-04-06 DIAGNOSIS — R20.9 SENSATION DISTURBANCE OF SKIN: ICD-10-CM

## 2022-04-06 DIAGNOSIS — L50.8 CHRONIC URTICARIA: ICD-10-CM

## 2022-04-06 DIAGNOSIS — D48.5 NEOPLASM OF UNCERTAIN BEHAVIOR OF SKIN: Primary | ICD-10-CM

## 2022-04-06 DIAGNOSIS — L72.0 MILIA: ICD-10-CM

## 2022-04-06 LAB
BASOPHILS # BLD: 0.1 K/MCL (ref 0–0.3)
BASOPHILS NFR BLD: 1 %
DEPRECATED RDW RBC: 44.9 FL (ref 39–50)
EOSINOPHIL # BLD: 0.3 K/MCL (ref 0–0.5)
EOSINOPHIL NFR BLD: 5 %
ERYTHROCYTE [DISTWIDTH] IN BLOOD: 12.6 % (ref 11–15)
HCT VFR BLD CALC: 41.4 % (ref 36–46.5)
HGB BLD-MCNC: 13.6 G/DL (ref 12–15.5)
IMM GRANULOCYTES # BLD AUTO: 0 K/MCL (ref 0–0.2)
IMM GRANULOCYTES # BLD: 0 %
LYMPHOCYTES # BLD: 1.9 K/MCL (ref 1–4)
LYMPHOCYTES NFR BLD: 31 %
MCH RBC QN AUTO: 31.9 PG (ref 26–34)
MCHC RBC AUTO-ENTMCNC: 32.9 G/DL (ref 32–36.5)
MCV RBC AUTO: 97.2 FL (ref 78–100)
MONOCYTES # BLD: 0.7 K/MCL (ref 0.3–0.9)
MONOCYTES NFR BLD: 13 %
NEUTROPHILS # BLD: 3 K/MCL (ref 1.8–7.7)
NEUTROPHILS NFR BLD: 50 %
NRBC BLD MANUAL-RTO: 0 /100 WBC
PLATELET # BLD AUTO: 226 K/MCL (ref 140–450)
RBC # BLD: 4.26 MIL/MCL (ref 4–5.2)
WBC # BLD: 5.9 K/MCL (ref 4.2–11)

## 2022-04-06 PROCEDURE — 36415 COLL VENOUS BLD VENIPUNCTURE: CPT | Performed by: INTERNAL MEDICINE

## 2022-04-06 PROCEDURE — 82746 ASSAY OF FOLIC ACID SERUM: CPT | Performed by: INTERNAL MEDICINE

## 2022-04-06 PROCEDURE — 83540 ASSAY OF IRON: CPT | Performed by: INTERNAL MEDICINE

## 2022-04-06 PROCEDURE — 82728 ASSAY OF FERRITIN: CPT | Performed by: INTERNAL MEDICINE

## 2022-04-06 PROCEDURE — 80050 GENERAL HEALTH PANEL: CPT | Performed by: INTERNAL MEDICINE

## 2022-04-06 PROCEDURE — 86008 ALLG SPEC IGE RECOMB EA: CPT | Performed by: INTERNAL MEDICINE

## 2022-04-06 PROCEDURE — 82607 VITAMIN B-12: CPT | Performed by: INTERNAL MEDICINE

## 2022-04-06 PROCEDURE — 83550 IRON BINDING TEST: CPT | Performed by: INTERNAL MEDICINE

## 2022-04-06 PROCEDURE — 99214 OFFICE O/P EST MOD 30 MIN: CPT | Performed by: DERMATOLOGY

## 2022-04-06 PROCEDURE — 82306 VITAMIN D 25 HYDROXY: CPT | Performed by: INTERNAL MEDICINE

## 2022-04-07 LAB
25(OH)D3+25(OH)D2 SERPL-MCNC: 49.4 NG/ML (ref 30–100)
ALBUMIN SERPL-MCNC: 4.1 G/DL (ref 3.6–5.1)
ALBUMIN/GLOB SERPL: 1.5 {RATIO} (ref 1–2.4)
ALP SERPL-CCNC: 65 UNITS/L (ref 45–117)
ALT SERPL-CCNC: 37 UNITS/L
ANION GAP SERPL CALC-SCNC: 8 MMOL/L (ref 10–20)
AST SERPL-CCNC: 30 UNITS/L
BILIRUB SERPL-MCNC: 0.3 MG/DL (ref 0.2–1)
BUN SERPL-MCNC: 17 MG/DL (ref 6–20)
BUN/CREAT SERPL: 21 (ref 7–25)
CALCIUM SERPL-MCNC: 9.7 MG/DL (ref 8.4–10.2)
CHLORIDE SERPL-SCNC: 103 MMOL/L (ref 98–107)
CO2 SERPL-SCNC: 31 MMOL/L (ref 21–32)
CREAT SERPL-MCNC: 0.81 MG/DL (ref 0.51–0.95)
FASTING DURATION TIME PATIENT: ABNORMAL H
FERRITIN SERPL-MCNC: 64 NG/ML (ref 8–252)
FOLATE SERPL-MCNC: 16.7 NG/ML
GFR SERPLBLD BASED ON 1.73 SQ M-ARVRAT: 81 ML/MIN
GLOBULIN SER-MCNC: 2.8 G/DL (ref 2–4)
GLUCOSE SERPL-MCNC: 83 MG/DL (ref 70–99)
IRON SATN MFR SERPL: 16 % (ref 15–45)
IRON SERPL-MCNC: 59 MCG/DL (ref 50–170)
POTASSIUM SERPL-SCNC: 4.4 MMOL/L (ref 3.4–5.1)
PROT SERPL-MCNC: 6.9 G/DL (ref 6.4–8.2)
SODIUM SERPL-SCNC: 138 MMOL/L (ref 135–145)
TIBC SERPL-MCNC: 368 MCG/DL (ref 250–450)
TSH SERPL-ACNC: 1.27 MCUNITS/ML (ref 0.35–5)
VIT B12 SERPL-MCNC: >2000 PG/ML (ref 211–911)

## 2022-04-11 ENCOUNTER — TELEPHONE (OUTPATIENT)
Dept: DERMATOLOGY | Age: 57
End: 2022-04-11

## 2022-04-11 LAB
REF LAB TEST NAME: NORMAL
SERVICE CMNT-IMP: NORMAL

## 2022-05-04 ENCOUNTER — TELEPHONE (OUTPATIENT)
Dept: DERMATOLOGY | Age: 57
End: 2022-05-04

## 2022-05-06 ENCOUNTER — TELEPHONE (OUTPATIENT)
Dept: INTERNAL MEDICINE | Age: 57
End: 2022-05-06

## 2022-05-12 ENCOUNTER — OFFICE VISIT (OUTPATIENT)
Dept: INTERNAL MEDICINE | Age: 57
End: 2022-05-12

## 2022-05-12 VITALS
DIASTOLIC BLOOD PRESSURE: 70 MMHG | BODY MASS INDEX: 20.89 KG/M2 | WEIGHT: 130 LBS | SYSTOLIC BLOOD PRESSURE: 120 MMHG | OXYGEN SATURATION: 99 % | HEIGHT: 66 IN | HEART RATE: 68 BPM

## 2022-05-12 DIAGNOSIS — L98.9 SKIN LESION: Primary | ICD-10-CM

## 2022-05-12 PROCEDURE — 99213 OFFICE O/P EST LOW 20 MIN: CPT | Performed by: INTERNAL MEDICINE

## 2022-07-22 ENCOUNTER — TELEPHONE (OUTPATIENT)
Dept: INTERNAL MEDICINE | Age: 57
End: 2022-07-22

## 2023-02-22 ENCOUNTER — TELEPHONE (OUTPATIENT)
Dept: INTERNAL MEDICINE | Age: 58
End: 2023-02-22

## 2023-03-02 ENCOUNTER — APPOINTMENT (OUTPATIENT)
Dept: INTERNAL MEDICINE | Age: 58
End: 2023-03-02

## 2025-05-13 ENCOUNTER — TELEPHONE (OUTPATIENT)
Dept: DERMATOLOGY | Age: 60
End: 2025-05-13

## 2025-05-13 DIAGNOSIS — D48.5 NEOPLASM OF UNCERTAIN BEHAVIOR OF SKIN: ICD-10-CM

## 2025-05-13 DIAGNOSIS — L71.0 PERIORAL DERMATITIS: ICD-10-CM

## 2025-05-13 RX ORDER — METRONIDAZOLE TOPICAL 7.5 MG/G
1.5 GEL TOPICAL AT BEDTIME
Status: SHIPPED | COMMUNITY
Start: 2025-05-13

## 2025-05-13 RX ORDER — MUPIROCIN 20 MG/G
1 OINTMENT TOPICAL 2 TIMES DAILY
Status: SHIPPED | COMMUNITY
Start: 2025-05-13

## 2025-05-13 RX ORDER — PIMECROLIMUS 10 MG/G
1 CREAM TOPICAL 2 TIMES DAILY PRN
Status: SHIPPED | COMMUNITY
Start: 2025-05-13